# Patient Record
Sex: MALE | Race: WHITE
[De-identification: names, ages, dates, MRNs, and addresses within clinical notes are randomized per-mention and may not be internally consistent; named-entity substitution may affect disease eponyms.]

---

## 2017-01-18 ENCOUNTER — HOSPITAL ENCOUNTER (OUTPATIENT)
Dept: HOSPITAL 62 - OD | Age: 52
End: 2017-01-18
Attending: FAMILY MEDICINE
Payer: MEDICARE

## 2017-01-18 DIAGNOSIS — R05: Primary | ICD-10-CM

## 2017-01-18 PROCEDURE — 71020: CPT

## 2019-01-09 ENCOUNTER — HOSPITAL ENCOUNTER (OUTPATIENT)
Dept: HOSPITAL 62 - OD | Age: 54
End: 2019-01-09
Attending: PHYSICIAN ASSISTANT
Payer: MEDICARE

## 2019-01-09 DIAGNOSIS — M25.561: Primary | ICD-10-CM

## 2019-01-09 NOTE — RADIOLOGY REPORT (SQ)
EXAM DESCRIPTION:  KNEE RIGHT 4 VIEWS



COMPLETED DATE/TIME:  1/9/2019 2:06 pm



REASON FOR STUDY:  PAIN IN RT KNEE M25.561  PAIN IN RIGHT KNEE



COMPARISON:  None.



NUMBER OF VIEWS:  Four views.



TECHNIQUE:  AP, lateral, and both oblique radiographic images acquired of the right knee.



LIMITATIONS:  None.



FINDINGS:  MINERALIZATION: Normal.

BONES: No acute fracture or dislocation.  No worrisome bone lesions.

JOINT: Likely small joint effusion

SOFT TISSUES: No soft tissue swelling.  No radio-opaque foreign body.

OTHER: No other significant finding.



IMPRESSION:  Likely small joint effusion without evidence of acute bony abnormality.



TECHNICAL DOCUMENTATION:  JOB ID:  [id number]

 2011 [employer]- All Rights Reserved



Reading location - IP/workstation name: [email or web address]

## 2020-10-23 ENCOUNTER — HOSPITAL ENCOUNTER (OUTPATIENT)
Dept: HOSPITAL 62 - SP | Age: 55
End: 2020-10-23
Attending: PHYSICIAN ASSISTANT
Payer: MEDICARE

## 2020-10-23 DIAGNOSIS — I35.0: Primary | ICD-10-CM

## 2020-10-23 PROCEDURE — 93306 TTE W/DOPPLER COMPLETE: CPT

## 2020-10-24 NOTE — XCELERA REPORT
[address] [address]

                               Tel: [phone]

                               Fax: [phone]



                      Transthoracic Echocardiogram Report

_______________________________________________________________________________



Name: CHECO CLEVELAND JR, JR

MRN: [id number]                                Age: 55 yrs

Gender: Male                                   : 1965

Patient Status: Outpatient                     Patient Location: 

Account #: [id number]

Study Date: 10/23/2020 10:08 AM

History: AS

Accession #: [id number]

_______________________________________________________________________________



Height: 63 in        Weight: 125 lb        BSA: 1.6 m2

_______________________________________________________________________________

Procedure: A complete two-dimensional transthoracic echocardiogram was

performed (2D, M-mode, spectral and color flow Doppler). The study was

technically difficult with many images being suboptimal in quality.

Reason For Study: AORTIC STENOSIS

Previous Evaluation: A previous study was performed on 2014 LVEF Normal.

Mild AS.

History: Aortic stenosis.



Ordering Physician: RAMÓN CONNELL

Performed By: Yovana Albright

_______________________________________________________________________________



Interpretation Summary

Left ventricular systolic function is normal.

The right ventricle is normal in size and function.

There is a trace amount of mitral regurgitation

There is moderate aortic stenosis

Compared to the prior echo study, there has been an increase in the severity

of aortic stenosis

There is a trace amount of tricuspid regurgitation

There is moderate pulmonary hypertension by echo

There is no pericardial effusion.



MMode/2D Measurements & Calculations



RVDd: 1.9 cm   LVIDd: 4.2 cm   FS: 36.7 %             Ao root diam: 2.4 cm

IVSd: 0.80 cm  LVIDs: 2.6 cm   EDV(Teich): 77.5 ml    Ao root area: 4.5 cm2

               LVPWd: 0.90 cm  ESV(Teich): 25.7 ml

                               EF(Teich): 66.9 %



Doppler Measurements & Calculations

MV E max josephine:       MV dec slope:        Ao V2 max:         LV V1 max P.4 cm/sec        535.8 cm/sec2        305.8 cm/sec       1.9 mmHg

MV A max josephine:       MV dec time: 0.21 secAo max PG:         LV V1 mean P.0 cm/sec                              37.4 mmHg          1.1 mmHg

MV E/A: 1.6                              Ao V2 mean:        LV V1 max:

                                         223.2 cm/sec       68.0 cm/sec

                                         Ao mean PG:        LV V1 mean:

                                         22.3 mmHg          49.6 cm/sec

                                         Ao V2 VTI: 77.3 cm LV V1 VTI:

                                                            17.9 cm

        _______________________________________________________________



PA V2 max:          TR max josephine:

103.3 cm/sec        269.6 cm/sec

PA max P.3 mmHg TR max P.3 mmHg



Left Ventricle

The left ventricle is normal in size. There is moderate concentric left

ventricular hypertrophy. Left ventricular systolic function is normal. The

Ejection Fraction estimate is 55-60%. Doppler measurements suggest

pseudonormalized left ventricular relaxation, which is associated with grade

II/IV or mild to moderate diastolic dysfunction.



Right Ventricle

The right ventricle is normal in size and function.



Atria

The right atrium is normal. The left atrial size is normal.



Mitral Valve

The mitral valve is grossly normal. There is a trace amount of mitral

regurgitation.





Aortic Valve

The aortic valve is sclerotic and shows some degree of functional abnormality.

The aortic valve is moderately calcified. Peak Ao V=3.06 m/s

Mean PG= 22 mm Hg. There is moderate aortic stenosis. Compared to the prior

echo study, there has been an increase in the severity of aortic stenosis.



Tricuspid Valve

The tricuspid valve is normal in structure and function. There is no tricuspid

stenosis. There is a trace amount of tricuspid regurgitation. Right

ventricular systolic pressure is estimated to be elevated at 50-60mmHg. There

is moderate pulmonary hypertension by echo.



Pulmonic Valve

The pulmonic valve is normal in structure and function. There is no pulmonic

valvular stenosis. There is a trace amount of pulmonic regurgitation.



Great Vessels

The aortic root is normal size. The inferior vena cava appeared dilated and

decreased < 50% with respiration (RAP 15-20 mmHg).





Effusions

There is no pericardial effusion.



_______________________________________________________________________________



_______________________________________________________________________________

Electronically signed by:      Hamlet Sexton      on 10/24/2020 06:47 AM



CC: RAMÓN CONNELL Anil

## 2020-11-13 ENCOUNTER — HOSPITAL ENCOUNTER (OUTPATIENT)
Dept: HOSPITAL 62 - RAD | Age: 55
End: 2020-11-13
Attending: INTERNAL MEDICINE
Payer: MEDICARE

## 2020-11-13 DIAGNOSIS — M47.896: ICD-10-CM

## 2020-11-13 DIAGNOSIS — R10.13: ICD-10-CM

## 2020-11-13 DIAGNOSIS — R63.4: Primary | ICD-10-CM

## 2020-11-13 PROCEDURE — 74177 CT ABD & PELVIS W/CONTRAST: CPT

## 2020-11-13 PROCEDURE — 82565 ASSAY OF CREATININE: CPT
